# Patient Record
Sex: MALE | Race: WHITE | Employment: FULL TIME | URBAN - METROPOLITAN AREA
[De-identification: names, ages, dates, MRNs, and addresses within clinical notes are randomized per-mention and may not be internally consistent; named-entity substitution may affect disease eponyms.]

---

## 2017-02-14 ENCOUNTER — OFFICE VISIT (OUTPATIENT)
Dept: ORTHOPEDIC SURGERY | Age: 47
End: 2017-02-14

## 2017-02-14 VITALS
SYSTOLIC BLOOD PRESSURE: 127 MMHG | HEIGHT: 66 IN | DIASTOLIC BLOOD PRESSURE: 83 MMHG | BODY MASS INDEX: 26.52 KG/M2 | HEART RATE: 72 BPM | WEIGHT: 165 LBS

## 2017-02-14 DIAGNOSIS — M23.42 CHONDRAL LOOSE BODY OF LEFT KNEE JOINT: ICD-10-CM

## 2017-02-14 DIAGNOSIS — M25.462 KNEE EFFUSION, LEFT: ICD-10-CM

## 2017-02-14 DIAGNOSIS — M25.562 ACUTE PAIN OF LEFT KNEE: Primary | ICD-10-CM

## 2017-02-14 DIAGNOSIS — M17.12 ARTHRITIS OF LEFT KNEE: ICD-10-CM

## 2017-02-14 PROCEDURE — 99213 OFFICE O/P EST LOW 20 MIN: CPT | Performed by: ORTHOPAEDIC SURGERY

## 2017-02-14 PROCEDURE — 20610 DRAIN/INJ JOINT/BURSA W/O US: CPT | Performed by: ORTHOPAEDIC SURGERY

## 2019-06-25 ENCOUNTER — OFFICE VISIT (OUTPATIENT)
Dept: ORTHOPEDIC SURGERY | Age: 49
End: 2019-06-25
Payer: COMMERCIAL

## 2019-06-25 ENCOUNTER — TELEPHONE (OUTPATIENT)
Dept: ORTHOPEDIC SURGERY | Age: 49
End: 2019-06-25

## 2019-06-25 VITALS
BODY MASS INDEX: 26.52 KG/M2 | SYSTOLIC BLOOD PRESSURE: 130 MMHG | WEIGHT: 165 LBS | HEIGHT: 66 IN | DIASTOLIC BLOOD PRESSURE: 82 MMHG | HEART RATE: 75 BPM

## 2019-06-25 DIAGNOSIS — M25.571 ACUTE RIGHT ANKLE PAIN: Primary | ICD-10-CM

## 2019-06-25 DIAGNOSIS — S93.491A SPRAIN OF ANTERIOR TALOFIBULAR LIGAMENT OF RIGHT ANKLE, INITIAL ENCOUNTER: ICD-10-CM

## 2019-06-25 DIAGNOSIS — S92.351A CLOSED FRACTURE OF BASE OF FIFTH METATARSAL BONE OF RIGHT FOOT, INITIAL ENCOUNTER: ICD-10-CM

## 2019-06-25 DIAGNOSIS — M79.671 RIGHT FOOT PAIN: ICD-10-CM

## 2019-06-25 DIAGNOSIS — S93.601A SPRAIN OF RIGHT FOOT, INITIAL ENCOUNTER: ICD-10-CM

## 2019-06-25 PROCEDURE — L4361 PNEUMA/VAC WALK BOOT PRE OTS: HCPCS | Performed by: ORTHOPAEDIC SURGERY

## 2019-06-25 PROCEDURE — 99214 OFFICE O/P EST MOD 30 MIN: CPT | Performed by: ORTHOPAEDIC SURGERY

## 2019-06-25 PROCEDURE — 28470 CLTX METATARSAL FX WO MNP EA: CPT | Performed by: ORTHOPAEDIC SURGERY

## 2019-06-25 ASSESSMENT — ENCOUNTER SYMPTOMS
GASTROINTESTINAL NEGATIVE: 1
RESPIRATORY NEGATIVE: 1
ALLERGIC/IMMUNOLOGIC NEGATIVE: 1
EYES NEGATIVE: 1

## 2019-06-25 NOTE — LETTER
Vascular: Examination reveals no swelling or calf tenderness. Peripheral pulses are palpable and 2+. Neurological: The patient has good coordination. There is no weakness or sensory deficit. Skin:    Head/Neck: inspection reveals no rashes, ulcerations or lesions. Trunk:  inspection reveals no rashes, ulcerations or lesions. Right Lower Extremity: inspection reveals no rashes, ulcerations or lesions. Left Lower Extremity: inspection reveals no rashes, ulcerations or lesions. Left ankle exam is normal.    Right ankle has moderate ecchymosis laterally. He has some fullness over the ATFL but no tenderness medially, and the syndesmosis, or laterally. He does have moderate tenderness over the short extensor and fifth metatarsal.  There is ecchymosis along the lateral calcaneus and toward the toes. Calf is soft. Neurologic and vascular exams are otherwise normal.    Three-view x-rays right foot AP, lateral, and oblique views obtained today demonstrate:  Spiral fracture at the base of the fifth metatarsal proximal to the watershed area. Assessment:      Right foot fifth metatarsal fracture    This is not a Francis fracture      Plan:                Explained this to the patient at length. He will go into a boot today. He can remove this for hygiene. He should not drive in the boot. He will start calcium citrate with vitamin D supplementation. Follow-up with me in 4 weeks. This note was created using voice recognition software. It has been proofread, but occasionally errors remain. Please disregard these errors. They will be corrected as they are noted. If you have questions, please do not hesitate to call me. I look forward to following David Luciano along with you.     Sincerely,        Clemente Woodall MD

## 2019-06-25 NOTE — TELEPHONE ENCOUNTER
6/25/19  [facility] [place]    -  NO PRECERT REQUIRED - PER RACHEL -  REF #[id number] -  NDS

## 2019-06-25 NOTE — PROGRESS NOTES
Subjective:      Patient ID: Sherita Pacheco is a 44 y.o. male. HPI  Sherita Pacheco injured his right foot when he stepped off a curb in his yard 2 days ago. Pain is 2 or 3 out of 10. He is leaving for vacation next week. He denies prior significant foot or ankle problems. I have seen him in the past for left knee arthritis. He is using ice and ibuprofen currently. Review of Systems   Constitutional: Negative. HENT: Negative. Eyes: Negative. Respiratory: Negative. Cardiovascular: Negative. Gastrointestinal: Negative. Endocrine: Negative. Genitourinary: Negative. Musculoskeletal: Positive for arthralgias, gait problem and joint swelling. Right ankle pain   Skin: Negative. Allergic/Immunologic: Negative. Hematological: Negative. Psychiatric/Behavioral: Negative. Objective:   Physical Exam  General Exam:    Vitals: Blood pressure 130/82, pulse 75, height 5' 6\" (1.676 m), weight 165 lb (74.8 kg). Constitutional: Patient is adequately groomed with no evidence of malnutrition  Mental Status: The patient is oriented to time, place and person. The patient's mood and affect are appropriate. Gait:  Patient walks with antalgic gait lymphatic: The lymphatic examination bilaterally reveals all areas to be without enlargement or induration. Vascular: Examination reveals no swelling or calf tenderness. Peripheral pulses are palpable and 2+. Neurological: The patient has good coordination. There is no weakness or sensory deficit. Skin:    Head/Neck: inspection reveals no rashes, ulcerations or lesions. Trunk:  inspection reveals no rashes, ulcerations or lesions. Right Lower Extremity: inspection reveals no rashes, ulcerations or lesions. Left Lower Extremity: inspection reveals no rashes, ulcerations or lesions. Left ankle exam is normal.    Right ankle has moderate ecchymosis laterally.   He has some fullness over the ATFL but no tenderness medially, and the syndesmosis, or laterally. He does have moderate tenderness over the short extensor and fifth metatarsal.  There is ecchymosis along the lateral calcaneus and toward the toes. Calf is soft. Neurologic and vascular exams are otherwise normal.    Three-view x-rays right foot AP, lateral, and oblique views obtained today demonstrate:  Spiral fracture at the base of the fifth metatarsal proximal to the watershed area. Assessment:      Right foot fifth metatarsal fracture    This is not a Francis fracture      Plan:                Explained this to the patient at length. He will go into a boot today. He can remove this for hygiene. He should not drive in the boot. He will start calcium citrate with vitamin D supplementation. Follow-up with me in 4 weeks. Procedures    Airselect Short Pneumatic Walking Boot     Patient [facility]. The right foot will require stabilization / immobilization from this semi-rigid / rigid orthosis to improve their function. The orthosis will assist in protecting the affected area, provide functional support and facilitate healing. Patient was instructed to progress ambulation weight bearing as tolerated in the device. The patient was educated and fit by a healthcare professional with expert knowledge and specialization in brace application while under the direct supervision of the physician. Verbal and written instructions for the use of and application of this item were provided. They were instructed to contact the office immediately should the brace result in increased pain, decreased sensation, increased swelling or worsening of the condition. This note was created using voice recognition software. It has been proofread, but occasionally errors remain.  Please disregard these

## 2019-07-23 ENCOUNTER — OFFICE VISIT (OUTPATIENT)
Dept: ORTHOPEDIC SURGERY | Age: 49
End: 2019-07-23

## 2019-07-23 VITALS
HEIGHT: 66 IN | BODY MASS INDEX: 26.52 KG/M2 | WEIGHT: 165 LBS | DIASTOLIC BLOOD PRESSURE: 88 MMHG | SYSTOLIC BLOOD PRESSURE: 137 MMHG | HEART RATE: 68 BPM

## 2019-07-23 DIAGNOSIS — S92.351D FRACTURE OF BASE OF FIFTH METATARSAL BONE OF RIGHT FOOT, WITH ROUTINE HEALING, SUBSEQUENT ENCOUNTER: ICD-10-CM

## 2019-07-23 DIAGNOSIS — M79.671 RIGHT FOOT PAIN: Primary | ICD-10-CM

## 2019-07-23 PROCEDURE — 99024 POSTOP FOLLOW-UP VISIT: CPT | Performed by: ORTHOPAEDIC SURGERY

## 2019-08-22 ENCOUNTER — OFFICE VISIT (OUTPATIENT)
Dept: ORTHOPEDIC SURGERY | Age: 49
End: 2019-08-22

## 2019-08-22 VITALS
HEART RATE: 79 BPM | HEIGHT: 66 IN | SYSTOLIC BLOOD PRESSURE: 150 MMHG | WEIGHT: 165 LBS | DIASTOLIC BLOOD PRESSURE: 102 MMHG | BODY MASS INDEX: 26.52 KG/M2

## 2019-08-22 DIAGNOSIS — S92.351D FRACTURE OF BASE OF FIFTH METATARSAL BONE OF RIGHT FOOT, WITH ROUTINE HEALING, SUBSEQUENT ENCOUNTER: Primary | ICD-10-CM

## 2019-08-22 DIAGNOSIS — M79.671 RIGHT FOOT PAIN: ICD-10-CM

## 2019-08-22 PROCEDURE — 99024 POSTOP FOLLOW-UP VISIT: CPT | Performed by: ORTHOPAEDIC SURGERY

## 2020-08-27 ENCOUNTER — OFFICE VISIT (OUTPATIENT)
Dept: ORTHOPEDIC SURGERY | Age: 50
End: 2020-08-27
Payer: COMMERCIAL

## 2020-08-27 VITALS — WEIGHT: 165 LBS | BODY MASS INDEX: 26.52 KG/M2 | HEIGHT: 66 IN | TEMPERATURE: 96.9 F

## 2020-08-27 PROCEDURE — 99214 OFFICE O/P EST MOD 30 MIN: CPT | Performed by: ORTHOPAEDIC SURGERY

## 2020-08-27 PROCEDURE — 20610 DRAIN/INJ JOINT/BURSA W/O US: CPT | Performed by: ORTHOPAEDIC SURGERY

## 2020-08-27 RX ORDER — MULTIVITAMIN
1 TABLET ORAL DAILY
COMMUNITY

## 2020-08-27 RX ORDER — LIDOCAINE HYDROCHLORIDE 10 MG/ML
4 INJECTION, SOLUTION INFILTRATION; PERINEURAL ONCE
Status: COMPLETED | OUTPATIENT
Start: 2020-08-27 | End: 2020-08-27

## 2020-08-27 RX ORDER — OMEGA-3/DHA/EPA/FISH OIL 60 MG-90MG
1 CAPSULE ORAL
COMMUNITY

## 2020-08-27 RX ORDER — METHYLPREDNISOLONE ACETATE 40 MG/ML
80 INJECTION, SUSPENSION INTRA-ARTICULAR; INTRALESIONAL; INTRAMUSCULAR; SOFT TISSUE ONCE
Status: COMPLETED | OUTPATIENT
Start: 2020-08-27 | End: 2020-08-27

## 2020-08-27 RX ORDER — MONTELUKAST SODIUM 10 MG/1
10 TABLET ORAL DAILY
COMMUNITY
Start: 2020-01-20

## 2020-08-27 RX ADMIN — METHYLPREDNISOLONE ACETATE 80 MG: 40 INJECTION, SUSPENSION INTRA-ARTICULAR; INTRALESIONAL; INTRAMUSCULAR; SOFT TISSUE at 09:38

## 2020-08-27 RX ADMIN — LIDOCAINE HYDROCHLORIDE 4 ML: 10 INJECTION, SOLUTION INFILTRATION; PERINEURAL at 09:38

## 2020-08-27 ASSESSMENT — ENCOUNTER SYMPTOMS
ALLERGIC/IMMUNOLOGIC COMMENTS: SEASONAL
EYES NEGATIVE: 1
GASTROINTESTINAL NEGATIVE: 1
RESPIRATORY NEGATIVE: 1

## 2020-08-27 NOTE — PROGRESS NOTES
Subjective:      Patient ID: Raciel Sanchez is a 44 y.o. male. HPI  Raciel Sanchez is seen today for his left knee. I last saw him for the knee 3 years ago. He has known loose chondral bodies in the knee. He did well with 2 aspirations and injections over the course of about 6 months in 2016 and 2017. Carlton Akins he is been pain-free for more than 3 years. About 3 weeks ago he was playing golf and felt sharp pain in the left knee and has had swelling since then. Pain can be as bad as 8 or 9 out of 10. He has been taking ibuprofen. Review of Systems   Constitutional: Negative. HENT: Negative. Eyes: Negative. Respiratory: Negative. Cardiovascular: Negative. Gastrointestinal: Negative. Endocrine: Negative. Genitourinary: Negative. Musculoskeletal: Positive for arthralgias, gait problem and joint swelling. Left knee pain    Skin: Negative. Allergic/Immunologic: Positive for environmental allergies. Seasonal    Hematological: Negative. Psychiatric/Behavioral: Negative. Objective:   Physical Exam  History: Patient's relevant past family, medical, and social history are reviewed as part of today's visit. ROS of pertinent positives and negatives as above; otherwise negative. General Exam:    Vitals: Temperature 96.9 °F (36.1 °C), temperature source Temporal, height 5' 6\" (1.676 m), weight 165 lb (74.8 kg). Constitutional: Patient is adequately groomed with no evidence of malnutrition  Mental Status: The patient is oriented to time, place and person. The patient's mood and affect are appropriate. Gait:  Patient walks a stifflegged gait. Edmundo Fajardo Lymphatic: The lymphatic examination bilaterally reveals all areas to be without enlargement or induration. Vascular: Examination reveals no swelling or calf tenderness. Peripheral pulses are palpable and 2+. Neurological: The patient has good coordination. There is no weakness or sensory deficit.     Skin:    Head/Neck: inspection reveals no rashes, ulcerations or lesions. Trunk:  inspection reveals no rashes, ulcerations or lesions. Right Lower Extremity: inspection reveals no rashes, ulcerations or lesions. Left Lower Extremity: inspection reveals no rashes, ulcerations or lesions. Examination of the bilateral hips reveals normal flexion and extension. There is no restriction in rotation. There is no tenderness to palpation anteriorly posteriorly or laterally. Right knee examination demonstrates no effusion. There is no tenderness to palpation over the medial or lateral joint line. There is no discomfort over the patellar tendon. There is no palpable popliteal cyst.  Sensation is intact. Range of motion is normal.  There is no patellofemoral crepitation. There is no instability to varus or  valgus stress applied at 0, 30, 60, or 90° of flexion. There is no anterior or posterior drawer. Lachman examination is normal.    Left knee examination demonstrates  A moderate effusion. He has significant tenderness throughout. Range of motion 0 to about 100 degrees. He has mild crepitation. Calf is soft. X-rays were obtained today. AP standing, PA flexed, and merchant views of the bilateral knees as well as a lateral of the left knee. These demonstrate: Multiple loose chondral bodies in the left knee. Assessment:      Exacerbation of left knee pain with loose chondral bodies and effusion      Plan:      We discussed the option of surgical treatment but he wants to try an aspiration and injection again as that was helpful 3 years ago. Procedure: Under sterile technique, the left knee was anesthetized in the suprapatellar pouch. I then aspirated the left knee of approximately 40 cc of serosanguineous fluid. Left knee was then injected with 80 mg of Depo-Medrol. Follow-up with me in 1 week. This note was created using voice recognition software. It has been proofread, but occasionally errors remain. Please disregard these errors. They will be corrected as they are noted.

## 2020-09-03 ENCOUNTER — OFFICE VISIT (OUTPATIENT)
Dept: ORTHOPEDIC SURGERY | Age: 50
End: 2020-09-03
Payer: COMMERCIAL

## 2020-09-03 VITALS — BODY MASS INDEX: 26.52 KG/M2 | TEMPERATURE: 97.4 F | WEIGHT: 165 LBS | HEIGHT: 66 IN | RESPIRATION RATE: 12 BRPM

## 2020-09-03 PROCEDURE — 99212 OFFICE O/P EST SF 10 MIN: CPT | Performed by: ORTHOPAEDIC SURGERY

## 2020-09-03 NOTE — PROGRESS NOTES
Angel Tabor returns today to follow-up his left knee. He had an aspiration injection 1 week ago and now says he feels totally better. He had no symptoms as of 1 day after the procedure. He is pleased. Patient's medications, allergies, past medical, surgical, social and family histories were reviewed and updated as appropriate. Relevant review of systems reviewed. General Exam:    Vitals: Temperature 97.4 °F (36.3 °C), temperature source Temporal, resp. rate 12, height 5' 6\" (1.676 m), weight 165 lb (74.8 kg). Constitutional: Patient is adequately groomed with no evidence of malnutrition  Mental Status: The patient is oriented to time, place and person. The patient's mood and affect are appropriate. He walks with a normal gait. Left knee has no effusion. He has no joint line pain. Calf is soft. Assessment: Doing well status post left knee aspiration and injection. Plan: If he has recurrent symptoms we still need to consider arthroscopy with removal of loose bodies but at this point we will continue to treat this with observation. Follow-up with me on an as-needed basis. This note was created using voice recognition software. It has been proofread, but occasionally errors remain. Please disregard these errors. They will be corrected as they are noted.

## 2021-04-13 ENCOUNTER — OFFICE VISIT (OUTPATIENT)
Dept: ORTHOPEDIC SURGERY | Age: 51
End: 2021-04-13
Payer: COMMERCIAL

## 2021-04-13 VITALS
HEART RATE: 65 BPM | WEIGHT: 165 LBS | SYSTOLIC BLOOD PRESSURE: 138 MMHG | BODY MASS INDEX: 26.52 KG/M2 | DIASTOLIC BLOOD PRESSURE: 89 MMHG | HEIGHT: 66 IN

## 2021-04-13 DIAGNOSIS — M25.562 ACUTE PAIN OF LEFT KNEE: ICD-10-CM

## 2021-04-13 DIAGNOSIS — M23.42 CHONDRAL LOOSE BODY OF LEFT KNEE JOINT: Primary | ICD-10-CM

## 2021-04-13 DIAGNOSIS — M25.462 EFFUSION OF LEFT KNEE: ICD-10-CM

## 2021-04-13 PROCEDURE — 20610 DRAIN/INJ JOINT/BURSA W/O US: CPT | Performed by: ORTHOPAEDIC SURGERY

## 2021-04-13 PROCEDURE — 99213 OFFICE O/P EST LOW 20 MIN: CPT | Performed by: ORTHOPAEDIC SURGERY

## 2021-04-13 NOTE — PROGRESS NOTES
Alicja Cole is today for his left knee. We did an aspiration injection in August 27, 2020. He had no symptoms until about 2 or 3 weeks ago when he noticed some increasing tightness. Last week he had some catching and pain was particularly bad. Now he describes his pain more as discomfort. He has had swelling. He feels limited. Initially, after his aspiration and injection for the next 5 months or so he was able to do exercises and plyometrics without difficulty. I have reviewed the patient's medical history in detail and updated the computerized patient record. General Exam:    Vitals: Blood pressure 138/89, pulse 65, height 5' 6\" (1.676 m), weight 165 lb (74.8 kg). Constitutional: Patient is adequately groomed with no evidence of malnutrition  Mental Status: The patient is oriented to time, place and person. The patient's mood and affect are appropriate. Left knee today has a large effusion. Range of motion is 2 to about 115 degrees. Calf is soft. He has no evidence of infection. He is stable. I reviewed his left knee x-rays from August.  These demonstrate: Multiple loose chondral bodies. Assessment: Recurrent left knee effusion. Plan: We again discussed the option of surgery. He would like to hold off. He understands that recurrences are likely. Procedure: Under sterile technique, left knee was anesthetized in the suprapatellar pouch. I then aspirated the left knee of about 40 cc of serous fluid. It decompressed nicely. Left knee was then injected with 80 mg of Depo-Medrol. Follow-up with me on an as-needed basis. This note was created using voice recognition software. It has been proofread, but occasionally errors remain. Please disregard these errors. They will be corrected as they are noted.

## 2022-10-10 ENCOUNTER — OFFICE VISIT (OUTPATIENT)
Dept: ORTHOPEDIC SURGERY | Age: 52
End: 2022-10-10
Payer: COMMERCIAL

## 2022-10-10 VITALS — BODY MASS INDEX: 26.52 KG/M2 | WEIGHT: 165 LBS | HEIGHT: 66 IN

## 2022-10-10 DIAGNOSIS — M23.42 CHONDRAL LOOSE BODY OF LEFT KNEE JOINT: ICD-10-CM

## 2022-10-10 DIAGNOSIS — M25.562 ACUTE PAIN OF LEFT KNEE: Primary | ICD-10-CM

## 2022-10-10 DIAGNOSIS — M25.462 EFFUSION OF LEFT KNEE: ICD-10-CM

## 2022-10-10 PROCEDURE — 99214 OFFICE O/P EST MOD 30 MIN: CPT | Performed by: ORTHOPAEDIC SURGERY

## 2022-10-10 PROCEDURE — 20610 DRAIN/INJ JOINT/BURSA W/O US: CPT | Performed by: ORTHOPAEDIC SURGERY

## 2022-10-10 RX ORDER — PREDNISONE 20 MG/1
40 TABLET ORAL DAILY
COMMUNITY
Start: 2022-06-13

## 2022-10-10 RX ORDER — LIDOCAINE HYDROCHLORIDE 10 MG/ML
3 INJECTION, SOLUTION INFILTRATION; PERINEURAL ONCE
Status: COMPLETED | OUTPATIENT
Start: 2022-10-10 | End: 2022-10-10

## 2022-10-10 RX ORDER — METHYLPREDNISOLONE 4 MG/1
TABLET ORAL
Qty: 1 KIT | Refills: 0 | Status: SHIPPED | OUTPATIENT
Start: 2022-10-10

## 2022-10-10 RX ADMIN — LIDOCAINE HYDROCHLORIDE 3 ML: 10 INJECTION, SOLUTION INFILTRATION; PERINEURAL at 13:50

## 2022-10-10 NOTE — PROGRESS NOTES
Halima Robert is seen today for an acute injury involving his left knee. Have seen him in the past most recently in August 2021. Has had no issues until yesterday when he was doing some maintenance work in the garage and driveway when he had immediate onset of severe anterior left knee pain and inability to fully straighten the knee. Pain is now 8 out of 10 it seems to be improving however. He has used ibuprofen and a crutch. He does office work. He is otherwise healthy. History: Patient's relevant past family, medical, and social history are reviewed as part of today's visit. ROS of pertinent positives and negatives as above; otherwise negative. General Exam:    Vitals: Height 5' 6\" (1.676 m), weight 165 lb (74.8 kg). Constitutional: Patient is adequately groomed with no evidence of malnutrition  Mental Status: The patient is oriented to time, place and person. The patient's mood and affect are appropriate. Gait:  Patient walks with normal 1 crutch with a flexed, antalgic gait. Brook Schwab Lymphatic: The lymphatic examination bilaterally reveals all areas to be without enlargement or induration. Vascular: Examination reveals no swelling or calf tenderness. Peripheral pulses are palpable and 2+. Neurological: The patient has good coordination. There is no weakness or sensory deficit. Skin:    Head/Neck: inspection reveals no rashes, ulcerations or lesions. Trunk:  inspection reveals no rashes, ulcerations or lesions. Right Lower Extremity: inspection reveals no rashes, ulcerations or lesions. Left Lower Extremity: inspection reveals no rashes, ulcerations or lesions. Right knee exam is very benign. He has no swelling or restriction of motion. Left knee has a large effusion. Range of motion is 20 to about 95 degrees. Calf is soft. He is grossly stable. X-rays were obtained today in the office and interpreted by me.   AP standing, PA flexed, and merchant views of the bilateral knees as well as a lateral of the left knee. These demonstrate: Couple chondral bodies throughout the right knee with moderate degenerative change but no acute bony abnormality when compared to x-rays from 2020. Assessment: Recurrent discomfort from loose chondral body left knee. Plan: Aspiration left knee. Procedure: Under sterile technique, left knee was anesthetized in the suprapatellar pouch. Then aspirate the left knee of about 40 cc of serosanguineous fluid. Medrol Dosepak was provided. He will ice his knee. He will try to keep it straight. Follow-up with me in about 2 weeks.

## 2022-10-27 ENCOUNTER — OFFICE VISIT (OUTPATIENT)
Dept: ORTHOPEDIC SURGERY | Age: 52
End: 2022-10-27
Payer: COMMERCIAL

## 2022-10-27 VITALS — BODY MASS INDEX: 26.52 KG/M2 | RESPIRATION RATE: 12 BRPM | HEIGHT: 66 IN | WEIGHT: 165 LBS

## 2022-10-27 DIAGNOSIS — M23.42 CHONDRAL LOOSE BODY OF LEFT KNEE JOINT: Primary | ICD-10-CM

## 2022-10-27 PROCEDURE — 99212 OFFICE O/P EST SF 10 MIN: CPT | Performed by: ORTHOPAEDIC SURGERY

## 2022-10-27 NOTE — PROGRESS NOTES
Miranda Ferrer returns today to follow-up his left knee. After an aspiration on October 10 he had very quick improvement in his symptoms within about 48 hours. He now is pain-free. He has no swelling. General Exam:    Vitals: Resp. rate 12, height 5' 6\" (1.676 m), weight 165 lb (74.8 kg). Constitutional: Patient is adequately groomed with no evidence of malnutrition  Mental Status: The patient is oriented to time, place and person. The patient's mood and affect are appropriate. Left knee today has no crepitation or joint line pain. He has full motion. He has no effusion. Calf is soft. I cannot provoke discomfort. Assessment: Resolving left knee effusion after aspiration. He has chronic loose body and has had a number of aspirations but typically his are more than 1 year apart. Plan: Given that he is asymptomatic we will treat this with observation. If he has recurrent symptoms we can again debate the utility of arthroscopy. He agrees. Follow-up with me on an as-needed basis.

## 2023-05-30 ENCOUNTER — OFFICE VISIT (OUTPATIENT)
Dept: ORTHOPEDIC SURGERY | Age: 53
End: 2023-05-30
Payer: COMMERCIAL

## 2023-05-30 ENCOUNTER — TELEPHONE (OUTPATIENT)
Dept: ORTHOPEDIC SURGERY | Age: 53
End: 2023-05-30

## 2023-05-30 VITALS — WEIGHT: 165 LBS | BODY MASS INDEX: 26.52 KG/M2 | HEIGHT: 66 IN

## 2023-05-30 DIAGNOSIS — M25.562 ACUTE PAIN OF LEFT KNEE: ICD-10-CM

## 2023-05-30 DIAGNOSIS — M23.42 CHONDRAL LOOSE BODY OF LEFT KNEE JOINT: Primary | ICD-10-CM

## 2023-05-30 PROCEDURE — 99212 OFFICE O/P EST SF 10 MIN: CPT | Performed by: ORTHOPAEDIC SURGERY

## 2023-05-30 RX ORDER — ALBUTEROL SULFATE 90 UG/1
2 AEROSOL, METERED RESPIRATORY (INHALATION)
COMMUNITY
Start: 2022-11-22

## 2023-05-30 RX ORDER — METHYLPREDNISOLONE 4 MG/1
TABLET ORAL
Qty: 1 KIT | Refills: 0 | Status: SHIPPED | OUTPATIENT
Start: 2023-05-30

## 2023-05-30 NOTE — PROGRESS NOTES
Jil Terry is seen today because he had an acute onset of left knee pain this morning. He felt like the loose body in his knee shifted and he had very severe pain that lasted for about an hour. He is leaving out of the country later this week to go to [place] and is worried about his pain coming back. Currently he is asymptomatic. General Exam:    Vitals: Height 5' 6\" (1.676 m), weight 165 lb (74.8 kg). Constitutional: Patient is adequately groomed with no evidence of malnutrition  Mental Status: The patient is oriented to time, place and person. The patient's mood and affect are appropriate. Left knee has mild to moderate crepitation. He has no effusion. I cannot provoke discomfort. He has full motion. I reviewed his x-rays which show multiple chondral bodies in the setting of left knee arthritis. Assessment: Left knee loose body that intermittently becomes symptomatic. Plan: Acute intervention is not warranted but because he is leaving the country I did write a prescription for Medrol Dosepak he can take that with him when he leaves the country and take it if necessary. He understands how to do so. Follow-up with me on an as-needed basis.